# Patient Record
Sex: MALE | Race: WHITE | NOT HISPANIC OR LATINO
[De-identification: names, ages, dates, MRNs, and addresses within clinical notes are randomized per-mention and may not be internally consistent; named-entity substitution may affect disease eponyms.]

---

## 2022-04-14 ENCOUNTER — NON-APPOINTMENT (OUTPATIENT)
Age: 62
End: 2022-04-14

## 2022-06-20 PROBLEM — Z00.00 ENCOUNTER FOR PREVENTIVE HEALTH EXAMINATION: Status: ACTIVE | Noted: 2022-06-20

## 2022-06-22 ENCOUNTER — NON-APPOINTMENT (OUTPATIENT)
Age: 62
End: 2022-06-22

## 2022-06-22 ENCOUNTER — APPOINTMENT (OUTPATIENT)
Dept: GASTROENTEROLOGY | Facility: CLINIC | Age: 62
End: 2022-06-22
Payer: COMMERCIAL

## 2022-06-22 VITALS
OXYGEN SATURATION: 98 % | BODY MASS INDEX: 34.22 KG/M2 | RESPIRATION RATE: 16 BRPM | WEIGHT: 239 LBS | HEIGHT: 70 IN | DIASTOLIC BLOOD PRESSURE: 90 MMHG | TEMPERATURE: 97.6 F | HEART RATE: 61 BPM | SYSTOLIC BLOOD PRESSURE: 130 MMHG

## 2022-06-22 DIAGNOSIS — Z01.818 ENCOUNTER FOR OTHER PREPROCEDURAL EXAMINATION: ICD-10-CM

## 2022-06-22 DIAGNOSIS — Z78.9 OTHER SPECIFIED HEALTH STATUS: ICD-10-CM

## 2022-06-22 PROCEDURE — 99204 OFFICE O/P NEW MOD 45 MIN: CPT

## 2022-06-22 RX ORDER — POLYETHYLENE GLYCOL 3350 AND ELECTROLYTES WITH LEMON FLAVOR 236; 22.74; 6.74; 5.86; 2.97 G/4L; G/4L; G/4L; G/4L; G/4L
236 POWDER, FOR SOLUTION ORAL
Qty: 1 | Refills: 0 | Status: ACTIVE | COMMUNITY
Start: 2022-06-22 | End: 1900-01-01

## 2022-06-22 NOTE — ASSESSMENT
[FreeTextEntry1] : 62 M with pmh of kidney stones presents for evaluation of screening colonoscopy and constipation.\par \par Constipation: Improved\par -Counseled on diagram/time, hydration, high-fiber diet\par -Can consider fiber supplement such as psyllium or Metamucil\par -MiraLAX as needed for daily soft bowel movements\par -We will check labs including CBC, CMP, celiac serology, thyroid profile\par -Plan for colonoscopy as below due to change in bowel habits\par \par CRC screening: Reports last colonoscopy was 7-8 years ago, report not available, thinks it was normal\par -We will plan for colonoscopy pending labs\par Colonoscopy to be scheduled. I have discussed the indications, risks and benefits of procedure with patient. Risks include, but not limited to, bleeding, perforation, infection, and reaction to anesthesia.  Patient was given the opportunity to ask questions, all questions were answered. The patient agrees to proceed with colonoscopy. Will provide prep instructions. Labs prior to procedure. COVID swab prior to procedure. \par \par

## 2022-06-22 NOTE — PHYSICAL EXAM
[General Appearance - Alert] : alert [General Appearance - In No Acute Distress] : in no acute distress [Sclera] : the sclera and conjunctiva were normal [Outer Ear] : the ears and nose were normal in appearance [Neck Appearance] : the appearance of the neck was normal [] : no respiratory distress [Normal] : the heart rate was normal [Abdomen Soft] : soft [Skin Color & Pigmentation] : normal skin color and pigmentation [Oriented To Time, Place, And Person] : oriented to person, place, and time [Abnormal Walk] : normal gait

## 2022-06-22 NOTE — HISTORY OF PRESENT ILLNESS
[de-identified] : 62 M with pmh of kidney stones presents for evaluation of screening colonoscopy and constipation.\par \par Patient reports that he was having constipation at the beginning of this year, at that time was also diagnosed with kidney stones and was being treated at another hospital.  At that time was not able to have a bowel movement every day, daily stool softener such as Dulcolax.  Subsequently started drinking more water as he had drank any water before.  Reports that change his doctor staying hydrated his constipation improved significantly, up to 80%, has daily bms, sometimes stool is hard, no blood in stool\par States that he is also due for a colonoscopy, last colonoscopy was 7-8 years ago and was asked to follow-up for repeat after 5 years.\par Denies any nausea/vomiting/fever/chills/pain abdomen/reflux/dysphagia\par No recent change in weight or appetite\par No family history of GI malignancy\par \par Last EGD 2003\par Last colonoscopy 7-8 years ago, report not available

## 2022-07-07 ENCOUNTER — LABORATORY RESULT (OUTPATIENT)
Age: 62
End: 2022-07-07

## 2022-07-10 ENCOUNTER — TRANSCRIPTION ENCOUNTER (OUTPATIENT)
Age: 62
End: 2022-07-10

## 2022-07-11 ENCOUNTER — APPOINTMENT (OUTPATIENT)
Dept: GASTROENTEROLOGY | Facility: GI CENTER | Age: 62
End: 2022-07-11

## 2022-07-11 ENCOUNTER — OUTPATIENT (OUTPATIENT)
Dept: OUTPATIENT SERVICES | Facility: HOSPITAL | Age: 62
LOS: 1 days | End: 2022-07-11
Payer: COMMERCIAL

## 2022-07-11 DIAGNOSIS — Z12.11 ENCOUNTER FOR SCREENING FOR MALIGNANT NEOPLASM OF COLON: ICD-10-CM

## 2022-07-11 PROCEDURE — G0121: CPT

## 2022-07-11 PROCEDURE — 45378 DIAGNOSTIC COLONOSCOPY: CPT | Mod: 53,33

## 2022-07-11 NOTE — PHYSICAL EXAM
[General Appearance - Alert] : alert [General Appearance - In No Acute Distress] : in no acute distress [Sclera] : the sclera and conjunctiva were normal [Outer Ear] : the ears and nose were normal in appearance [Neck Appearance] : the appearance of the neck was normal [] : no respiratory distress [Abdomen Soft] : soft [Abdomen Tenderness] : non-tender [Abnormal Walk] : normal gait [Skin Color & Pigmentation] : normal skin color and pigmentation [Oriented To Time, Place, And Person] : oriented to person, place, and time

## 2022-07-11 NOTE — HISTORY OF PRESENT ILLNESS
[de-identified] : 62 M with pmh of kidney stones presents for evaluation of screening colonoscopy

## 2022-07-11 NOTE — ASSESSMENT
[FreeTextEntry1] : Patient here for colonoscopy.  Patient completed prep, n.p.o. past midnight.  Explained the procedure including risks/benefits and adverse events such as infection, bleeding, perforation and possible risks with anesthesia.  Patient agreeable, will proceed with procedure.  We will obtain consent.\par \par \par pt notified about abnormal thyroids function tests, he will f/u with his pcp\par \par addendum: pt had diverticulosis and  tortuous , fixed colon, incomplete colonoscopy, only able to advance till transverse colon, will order CT colonography

## 2022-07-12 ENCOUNTER — OUTPATIENT (OUTPATIENT)
Dept: OUTPATIENT SERVICES | Facility: HOSPITAL | Age: 62
LOS: 1 days | End: 2022-07-12
Payer: COMMERCIAL

## 2022-07-12 ENCOUNTER — APPOINTMENT (OUTPATIENT)
Dept: CT IMAGING | Facility: CLINIC | Age: 62
End: 2022-07-12

## 2022-07-12 DIAGNOSIS — Z12.11 ENCOUNTER FOR SCREENING FOR MALIGNANT NEOPLASM OF COLON: ICD-10-CM

## 2022-07-12 LAB
ALBUMIN SERPL ELPH-MCNC: 4 G/DL
ALP BLD-CCNC: 91 U/L
ALT SERPL-CCNC: 18 U/L
ANION GAP SERPL CALC-SCNC: 9 MMOL/L
AST SERPL-CCNC: 15 U/L
BASOPHILS # BLD AUTO: 0.05 K/UL
BASOPHILS NFR BLD AUTO: 0.9 %
BILIRUB SERPL-MCNC: 0.3 MG/DL
BUN SERPL-MCNC: 10 MG/DL
CALCIUM SERPL-MCNC: 9.1 MG/DL
CHLORIDE SERPL-SCNC: 109 MMOL/L
CO2 SERPL-SCNC: 26 MMOL/L
CREAT SERPL-MCNC: 1.07 MG/DL
EGFR: 79 ML/MIN/1.73M2
EOSINOPHIL # BLD AUTO: 0.38 K/UL
EOSINOPHIL NFR BLD AUTO: 6.5 %
GLIADIN IGA SER QL: <5 UNITS
GLIADIN IGG SER QL: <5 UNITS
GLIADIN PEPTIDE IGA SER-ACNC: NEGATIVE
GLIADIN PEPTIDE IGG SER-ACNC: NEGATIVE
GLUCOSE SERPL-MCNC: 101 MG/DL
HCT VFR BLD CALC: 42.4 %
HGB BLD-MCNC: 13.9 G/DL
IGA SER QL IEP: 317 MG/DL
IMM GRANULOCYTES NFR BLD AUTO: 0.2 %
LYMPHOCYTES # BLD AUTO: 2.25 K/UL
LYMPHOCYTES NFR BLD AUTO: 38.6 %
MAN DIFF?: NORMAL
MCHC RBC-ENTMCNC: 29.3 PG
MCHC RBC-ENTMCNC: 32.8 GM/DL
MCV RBC AUTO: 89.3 FL
MONOCYTES # BLD AUTO: 0.47 K/UL
MONOCYTES NFR BLD AUTO: 8.1 %
NEUTROPHILS # BLD AUTO: 2.67 K/UL
NEUTROPHILS NFR BLD AUTO: 45.7 %
PLATELET # BLD AUTO: 161 K/UL
POTASSIUM SERPL-SCNC: 4.6 MMOL/L
PROT SERPL-MCNC: 6.6 G/DL
RBC # BLD: 4.75 M/UL
RBC # FLD: 14.1 %
SARS-COV-2 N GENE NPH QL NAA+PROBE: NOT DETECTED
SODIUM SERPL-SCNC: 143 MMOL/L
TSH SERPL-ACNC: 6.52 UIU/ML
TTG IGA SER IA-ACNC: <1.2 U/ML
TTG IGA SER-ACNC: NEGATIVE
TTG IGG SER IA-ACNC: <1.2 U/ML
TTG IGG SER IA-ACNC: NEGATIVE
WBC # FLD AUTO: 5.83 K/UL

## 2022-07-12 PROCEDURE — 74261 CT COLONOGRAPHY DX: CPT

## 2022-07-12 PROCEDURE — 74261 CT COLONOGRAPHY DX: CPT | Mod: 26

## 2022-09-09 ENCOUNTER — APPOINTMENT (OUTPATIENT)
Dept: GASTROENTEROLOGY | Facility: CLINIC | Age: 62
End: 2022-09-09

## 2022-09-09 VITALS
OXYGEN SATURATION: 98 % | HEART RATE: 70 BPM | WEIGHT: 239 LBS | RESPIRATION RATE: 16 BRPM | BODY MASS INDEX: 34.22 KG/M2 | DIASTOLIC BLOOD PRESSURE: 80 MMHG | SYSTOLIC BLOOD PRESSURE: 125 MMHG | HEIGHT: 70 IN | TEMPERATURE: 98 F

## 2022-09-09 DIAGNOSIS — K57.30 DIVERTICULOSIS OF LARGE INTESTINE W/OUT PERFORATION OR ABSCESS W/OUT BLEEDING: ICD-10-CM

## 2022-09-09 DIAGNOSIS — K59.00 CONSTIPATION, UNSPECIFIED: ICD-10-CM

## 2022-09-09 DIAGNOSIS — Z09 ENCOUNTER FOR FOLLOW-UP EXAMINATION AFTER COMPLETED TREATMENT FOR CONDITIONS OTHER THAN MALIGNANT NEOPLASM: ICD-10-CM

## 2022-09-09 PROCEDURE — 99212 OFFICE O/P EST SF 10 MIN: CPT

## 2022-09-09 NOTE — PHYSICAL EXAM
[General Appearance - Alert] : alert [General Appearance - In No Acute Distress] : in no acute distress [Sclera] : the sclera and conjunctiva were normal [PERRL With Normal Accommodation] : pupils were equal in size, round, and reactive to light [Extraocular Movements] : extraocular movements were intact [Outer Ear] : the ears and nose were normal in appearance [Oropharynx] : the oropharynx was normal [Neck Appearance] : the appearance of the neck was normal [Neck Cervical Mass (___cm)] : no neck mass was observed [Jugular Venous Distention Increased] : there was no jugular-venous distention [Thyroid Diffuse Enlargement] : the thyroid was not enlarged [Thyroid Nodule] : there were no palpable thyroid nodules [Auscultation Breath Sounds / Voice Sounds] : lungs were clear to auscultation bilaterally [Heart Rate And Rhythm] : heart rate was normal and rhythm regular [Heart Sounds] : normal S1 and S2 [Heart Sounds Gallop] : no gallops [Murmurs] : no murmurs [Heart Sounds Pericardial Friction Rub] : no pericardial rub [Bowel Sounds] : normal bowel sounds [Abdomen Soft] : soft [Abdomen Tenderness] : non-tender [Abdomen Mass (___ Cm)] : no abdominal mass palpated [Skin Color & Pigmentation] : normal skin color and pigmentation [Skin Turgor] : normal skin turgor [] : no rash [No Focal Deficits] : no focal deficits [Oriented To Time, Place, And Person] : oriented to person, place, and time [Impaired Insight] : insight and judgment were intact [Affect] : the affect was normal

## 2022-09-09 NOTE — HISTORY OF PRESENT ILLNESS
[de-identified] : The patient arrived after a CT colonography test.  The patient has been evaluated by Dr. Jackman.  The patient has no complaints.  He had undergone a colonoscopy which revealed diverticulosis but it was incomplete.  CT colonography showed no evidence of any polyps.  He has no complaint except for occasional constipation.

## 2022-09-09 NOTE — ASSESSMENT
[FreeTextEntry1] : Discussed the colonoscopy finding and CT colonography findings at length.  As patient has no polyps, will consider colonoscopy versus CT colonography in 5 years.  Due to diverticulosis and constipation, high-fiber diet was recommended.  Follow-up as needed.\par \par Leandro Hanks MD\par Gastroenterology \par \par

## 2023-08-21 ENCOUNTER — OFFICE (OUTPATIENT)
Dept: URBAN - METROPOLITAN AREA CLINIC 12 | Facility: CLINIC | Age: 63
Setting detail: OPHTHALMOLOGY
End: 2023-08-21
Payer: COMMERCIAL

## 2023-08-21 DIAGNOSIS — H43.393: ICD-10-CM

## 2023-08-21 DIAGNOSIS — H25.13: ICD-10-CM

## 2023-08-21 DIAGNOSIS — H52.4: ICD-10-CM

## 2023-08-21 DIAGNOSIS — H53.002: ICD-10-CM

## 2023-08-21 PROCEDURE — 92014 COMPRE OPH EXAM EST PT 1/>: CPT | Performed by: OPHTHALMOLOGY

## 2023-08-21 PROCEDURE — 92015 DETERMINE REFRACTIVE STATE: CPT | Performed by: OPHTHALMOLOGY

## 2023-08-21 ASSESSMENT — AXIALLENGTH_DERIVED
OS_AL: 21.8951
OD_AL: 22.4078
OD_AL: 22.5404
OS_AL: 21.5646
OS_AL: 21.8532
OD_AL: 22.2334

## 2023-08-21 ASSESSMENT — KERATOMETRY
OD_K2POWER_DIOPTERS: 44.75
OS_AXISANGLE_DEGREES: 010
OS_K1POWER_DIOPTERS: 43.75
OS_K2POWER_DIOPTERS: 45.25
OD_K1POWER_DIOPTERS: 44.00
OD_AXISANGLE_DEGREES: 006

## 2023-08-21 ASSESSMENT — VISUAL ACUITY
OD_BCVA: 20/100-
OS_BCVA: 20/50+

## 2023-08-21 ASSESSMENT — SPHEQUIV_DERIVED
OD_SPHEQUIV: 2.875
OD_SPHEQUIV: 2.375
OS_SPHEQUIV: 3.875
OS_SPHEQUIV: 3.75
OD_SPHEQUIV: 2
OS_SPHEQUIV: 4.75

## 2023-08-21 ASSESSMENT — REFRACTION_CURRENTRX
OS_AXIS: 075
OD_ADD: +1.25
OS_OVR_VA: 20/
OS_SPHERE: +3.50
OD_CYLINDER: -1.00
OD_AXIS: 086
OD_OVR_VA: 20/
OS_CYLINDER: -1.00
OS_ADD: +1.25
OD_SPHERE: +2.25

## 2023-08-21 ASSESSMENT — REFRACTION_MANIFEST
OD_VA1: 20/20-
OS_ADD: +2.00
OD_SPHERE: +2.25
OD_VA1: 20/20
OS_ADD: +2.25
OS_SPHERE: +4.00
OD_ADD: +2.25
OS_CYLINDER: -0.75
OD_CYLINDER: -0.75
OD_AXIS: 090
OS_AXIS: 100
OD_SPHERE: +2.75
OD_AXIS: 105
OS_AXIS: 110
OS_SPHERE: +4.25
OD_CYLINDER: -0.50
OS_VA1: 20/30-
OS_CYLINDER: -0.50
OS_VA1: 20/50-2
OD_ADD: +2.00

## 2023-08-21 ASSESSMENT — REFRACTION_AUTOREFRACTION
OD_SPHERE: +3.25
OS_CYLINDER: -1.00
OD_CYLINDER: -0.75
OS_SPHERE: +5.25
OD_AXIS: 096
OS_AXIS: 109

## 2023-08-21 ASSESSMENT — TONOMETRY
OS_IOP_MMHG: 15
OD_IOP_MMHG: 11

## 2023-08-21 ASSESSMENT — CONFRONTATIONAL VISUAL FIELD TEST (CVF)
OS_FINDINGS: FULL
OD_FINDINGS: FULL

## 2023-09-01 ENCOUNTER — APPOINTMENT (OUTPATIENT)
Dept: ORTHOPEDIC SURGERY | Facility: CLINIC | Age: 63
End: 2023-09-01
Payer: COMMERCIAL

## 2023-09-01 VITALS — BODY MASS INDEX: 35.55 KG/M2 | WEIGHT: 240 LBS | HEIGHT: 69 IN

## 2023-09-01 DIAGNOSIS — M47.816 SPONDYLOSIS W/OUT MYELOPATHY OR RADICULOPATHY, LUMBAR REGION: ICD-10-CM

## 2023-09-01 DIAGNOSIS — M54.9 DORSALGIA, UNSPECIFIED: ICD-10-CM

## 2023-09-01 DIAGNOSIS — M47.814 SPONDYLOSIS W/OUT MYELOPATHY OR RADICULOPATHY, THORACIC REGION: ICD-10-CM

## 2023-09-01 PROCEDURE — 72070 X-RAY EXAM THORAC SPINE 2VWS: CPT

## 2023-09-01 PROCEDURE — 99203 OFFICE O/P NEW LOW 30 MIN: CPT

## 2023-09-01 PROCEDURE — 72100 X-RAY EXAM L-S SPINE 2/3 VWS: CPT

## 2023-09-01 NOTE — ASSESSMENT
[FreeTextEntry1] : 64 y/o male with thoracic and lumbar degeneration, with acute back pain episode.. Patient was provided with a referral for lumbar and thoracic physical therapy to work on stretching, strengthening and range of motion. Patient was provided with a lumbar home exercise program. I am prescribing the patient Meloxicam (15mg x7-10 days then 7.5mg up to BID PRN) for pain relief. Titration Schedule Provided. Follow up as needed.  Prior to appointment and during encounter with patient extensive medical records were reviewed including but not limited to, hospital records, out patient records, imaging results, and lab data. During this appointment the patient was examined, diagnoses were discussed and explained in a face to face manner. In addition extensive time was spent reviewing aforementioned diagnostic studies. Counseling including abnormal image results, differential diagnoses, treatment options, risk and benefits, lifestyle changes, current condition, and current medications was performed. Patient's comments, questions, and concerns were address and patient verbalized understanding. Based on this patient's presentation at our office, which is an orthopedic spine surgeon's office, this patient inherently / intrinsically has a risk, however minute, of developing issues such as Cauda equina syndrome, bowel and bladder changes, or progression of motor or neurological deficits such as paralysis which may be permanent.   I, Marisa Patel, attest that this documentation has been prepared under the direction and in the presence of provider Shay Morales MD.

## 2023-09-01 NOTE — PHYSICAL EXAM
[de-identified] : Constitutional: - General Appearance: Unremarkable Body Habitus Well Developed Well Nourished Body Habitus No Deformities Well Groomed Ability To communicate: Normal Neurologic: Global sensation is intact to upper and lower extremities. See examination of Neck and/or Spine for exceptions. Orientation to Time, Place and Person is: Normal Mood And Affect is Normal Skin: - Head/Face, Right Upper/Lower Extremity, Left Upper/Lower Extremity: Normal See Examination of Neck and/or Spine for exceptions Cardiovascular: Peripheral Cardiovascular System is Normal Palpation of Lymph Nodes: Normal Palpation of lymph nodes in: Axilla, Cervical, Inguinal Abnormal Palpation of lymph nodes in: None  [] : non-antalgic [FreeTextEntry8] : tenderness from t10 to pelvis b/l  [FreeTextEntry1] : On my interpretations of these images from [facility] in house on 09/01/2023: mod multi level DDD mild scoliosis with apex at T5 [[id number]] : forward flexion 60 degrees

## 2023-09-01 NOTE — HISTORY OF PRESENT ILLNESS
[Gradual] : gradual [8] : 8 [2] : 2 [Diffuse] : diffuse [Localized] : localized [Sharp] : sharp [Frequent] : frequent [Rest] : rest [Full time] : Work status: full time [de-identified] : 09/01/2023: Patient presenting today for an initial evaluation. He states mid back pain that started ~7 weeks prior.  No cause to injury. States pain mid back pain radiates to armpit line and down to low back. No pain wrapping around chest. No pain, numbness, tingling or weakness in the lower extremities b/l. No pain, numbness, tingling or weakness in the lower extremities b/l. Pain level is a 2-8/10 depending on day and activity. Has treated with Advil and states relief. Has treated with PT in past for low back pain ~40 years prior. Patient has knee problems. General health is good.     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

## 2023-12-19 ENCOUNTER — RX RENEWAL (OUTPATIENT)
Age: 63
End: 2023-12-19

## 2023-12-19 RX ORDER — MELOXICAM 15 MG/1
15 TABLET ORAL DAILY
Qty: 30 | Refills: 2 | Status: ACTIVE | COMMUNITY
Start: 2023-09-01 | End: 1900-01-01

## 2024-02-26 ENCOUNTER — OFFICE (OUTPATIENT)
Dept: URBAN - METROPOLITAN AREA CLINIC 12 | Facility: CLINIC | Age: 64
Setting detail: OPHTHALMOLOGY
End: 2024-02-26
Payer: COMMERCIAL

## 2024-02-26 DIAGNOSIS — H43.393: ICD-10-CM

## 2024-02-26 DIAGNOSIS — H52.4: ICD-10-CM

## 2024-02-26 DIAGNOSIS — H90.3: ICD-10-CM

## 2024-02-26 DIAGNOSIS — H35.373: ICD-10-CM

## 2024-02-26 DIAGNOSIS — H53.002: ICD-10-CM

## 2024-02-26 DIAGNOSIS — H25.13: ICD-10-CM

## 2024-02-26 PROCEDURE — 92014 COMPRE OPH EXAM EST PT 1/>: CPT | Performed by: OPHTHALMOLOGY

## 2024-02-26 PROCEDURE — 92015 DETERMINE REFRACTIVE STATE: CPT | Performed by: OPHTHALMOLOGY

## 2024-02-26 PROCEDURE — 92134 CPTRZ OPH DX IMG PST SGM RTA: CPT | Performed by: OPHTHALMOLOGY

## 2024-02-26 PROCEDURE — 92557 COMPREHENSIVE HEARING TEST: CPT

## 2024-02-26 PROCEDURE — 92567 TYMPANOMETRY: CPT

## 2024-02-26 ASSESSMENT — REFRACTION_MANIFEST
OD_CYLINDER: -0.75
OD_VA1: 20/20
OS_ADD: +2.00
OS_SPHERE: +4.25
OS_AXIS: 100
OS_CYLINDER: -0.75
OD_CYLINDER: -0.50
OS_AXIS: 108
OS_SPHERE: +5.00
OD_ADD: +2.00
OS_CYLINDER: -0.75
OD_AXIS: 089
OS_VA1: 20/50-2
OD_AXIS: 090
OS_ADD: +2.25
OS_VA1: 20/30-2
OD_SPHERE: +3.25
OD_SPHERE: +2.75
OD_VA1: 20/20-
OD_ADD: +2.25

## 2024-02-26 ASSESSMENT — REFRACTION_AUTOREFRACTION
OS_SPHERE: +5.00
OS_CYLINDER: -0.75
OD_AXIS: 089
OS_AXIS: 108
OD_SPHERE: +3.25
OD_CYLINDER: -0.50

## 2024-02-26 ASSESSMENT — CONFRONTATIONAL VISUAL FIELD TEST (CVF)
OD_FINDINGS: FULL
OS_FINDINGS: FULL

## 2024-02-26 ASSESSMENT — REFRACTION_CURRENTRX
OS_VPRISM_DIRECTION: PROGS
OS_AXIS: 087
OD_OVR_VA: 20/
OS_CYLINDER: -1.00
OD_SPHERE: +2.25
OD_ADD: +1.75
OD_AXIS: 089
OS_OVR_VA: 20/
OD_VPRISM_DIRECTION: PROGS
OD_CYLINDER: -0.75
OS_SPHERE: +3.25
OS_ADD: +1.75

## 2024-02-26 ASSESSMENT — SPHEQUIV_DERIVED
OS_SPHEQUIV: 4.625
OS_SPHEQUIV: 4.625
OD_SPHEQUIV: 3
OS_SPHEQUIV: 3.875
OD_SPHEQUIV: 2.375
OD_SPHEQUIV: 3

## 2024-08-29 ENCOUNTER — OFFICE (OUTPATIENT)
Dept: URBAN - METROPOLITAN AREA CLINIC 12 | Facility: CLINIC | Age: 64
Setting detail: OPHTHALMOLOGY
End: 2024-08-29
Payer: COMMERCIAL

## 2024-08-29 DIAGNOSIS — H53.002: ICD-10-CM

## 2024-08-29 DIAGNOSIS — H43.393: ICD-10-CM

## 2024-08-29 DIAGNOSIS — H25.13: ICD-10-CM

## 2024-08-29 DIAGNOSIS — H35.373: ICD-10-CM

## 2024-08-29 PROCEDURE — 92014 COMPRE OPH EXAM EST PT 1/>: CPT | Performed by: OPHTHALMOLOGY

## 2024-08-29 PROCEDURE — 92250 FUNDUS PHOTOGRAPHY W/I&R: CPT | Performed by: OPHTHALMOLOGY

## 2024-08-29 ASSESSMENT — CONFRONTATIONAL VISUAL FIELD TEST (CVF)
OS_FINDINGS: FULL
OD_FINDINGS: FULL

## 2024-10-29 ENCOUNTER — OFFICE (OUTPATIENT)
Dept: URBAN - METROPOLITAN AREA CLINIC 12 | Facility: CLINIC | Age: 64
Setting detail: OPHTHALMOLOGY
End: 2024-10-29
Payer: COMMERCIAL

## 2024-10-29 DIAGNOSIS — H00.14: ICD-10-CM
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
## 2024-10-29 PROCEDURE — 99213 OFFICE O/P EST LOW 20 MIN: CPT | Performed by: OPTOMETRIST

## 2024-10-29 PROCEDURE — BRUDER EYE BRUDER EYE PADS: Performed by: OPTOMETRIST

## 2024-10-29 ASSESSMENT — KERATOMETRY
OD_AXISANGLE_DEGREES: 012
OD_K1POWER_DIOPTERS: 44.25
OS_K2POWER_DIOPTERS: 45.00
OS_K1POWER_DIOPTERS: 43.50
OS_AXISANGLE_DEGREES: 017
OD_K2POWER_DIOPTERS: 45.00

## 2024-10-29 ASSESSMENT — REFRACTION_MANIFEST
OS_CYLINDER: -0.75
OS_AXIS: 100
OD_AXIS: 089
OS_AXIS: 110
OD_AXIS: 090
OD_VA1: 20/25
OD_CYLINDER: -0.75
OD_SPHERE: +2.75
OD_CYLINDER: -0.50
OD_ADD: +2.25
OS_AXIS: 108
OS_SPHERE: +5.50
OS_CYLINDER: -0.75
OS_SPHERE: +5.00
OD_ADD: +2.00
OD_VA1: 20/20
OS_VA1: 20/NI
OD_VA1: 20/20-
OS_CYLINDER: -1.25
OD_SPHERE: +3.25
OD_CYLINDER: -0.50
OS_ADD: +2.00
OS_ADD: +2.25
OD_SPHERE: +3.25
OD_AXIS: 100
OS_VA1: 20/50-2
OS_VA1: 20/30-2
OS_SPHERE: +4.25

## 2024-10-29 ASSESSMENT — REFRACTION_AUTOREFRACTION
OS_SPHERE: +5.75
OD_SPHERE: +3.50
OD_CYLINDER: -0.75
OD_AXIS: 099
OS_CYLINDER: -1.25
OS_AXIS: 107

## 2024-10-29 ASSESSMENT — REFRACTION_CURRENTRX
OS_AXIS: 104
OD_VPRISM_DIRECTION: PROGS
OD_ADD: +2.00
OS_OVR_VA: 20/
OS_ADD: +2.00
OS_VPRISM_DIRECTION: PROGS
OD_SPHERE: +2.25
OD_OVR_VA: 20/
OS_CYLINDER: -0.50
OD_CYLINDER: -0.75
OS_SPHERE: +4.00
OD_AXIS: 088

## 2024-10-29 ASSESSMENT — CONFRONTATIONAL VISUAL FIELD TEST (CVF)
OS_FINDINGS: FULL
OD_FINDINGS: FULL

## 2024-10-29 ASSESSMENT — TONOMETRY
OS_IOP_MMHG: 11
OD_IOP_MMHG: 11

## 2024-10-29 ASSESSMENT — VISUAL ACUITY
OD_BCVA: 20/60+1
OS_BCVA: 20/25-2

## 2024-11-17 ENCOUNTER — OFFICE (OUTPATIENT)
Dept: URBAN - METROPOLITAN AREA CLINIC 100 | Facility: CLINIC | Age: 64
Setting detail: OPHTHALMOLOGY
End: 2024-11-17
Payer: COMMERCIAL

## 2024-11-17 DIAGNOSIS — H00.14: ICD-10-CM

## 2024-11-17 PROCEDURE — 99213 OFFICE O/P EST LOW 20 MIN: CPT | Performed by: STUDENT IN AN ORGANIZED HEALTH CARE EDUCATION/TRAINING PROGRAM

## 2024-11-17 ASSESSMENT — REFRACTION_AUTOREFRACTION
OD_CYLINDER: -0.75
OS_SPHERE: +5.75
OS_AXIS: 107
OD_AXIS: 099
OD_SPHERE: +3.50
OS_CYLINDER: -1.25

## 2024-11-17 ASSESSMENT — KERATOMETRY
OS_K1POWER_DIOPTERS: 43.50
OD_AXISANGLE_DEGREES: 012
OS_AXISANGLE_DEGREES: 017
OD_K2POWER_DIOPTERS: 45.00
OS_K2POWER_DIOPTERS: 45.00
OD_K1POWER_DIOPTERS: 44.25

## 2024-11-17 ASSESSMENT — VISUAL ACUITY
OD_BCVA: 20/70+1
OS_BCVA: 20/20

## 2024-11-17 ASSESSMENT — CONFRONTATIONAL VISUAL FIELD TEST (CVF)
OS_FINDINGS: FULL
OD_FINDINGS: FULL

## 2025-03-06 ENCOUNTER — OFFICE (OUTPATIENT)
Dept: URBAN - METROPOLITAN AREA CLINIC 12 | Facility: CLINIC | Age: 65
Setting detail: OPHTHALMOLOGY
End: 2025-03-06
Payer: COMMERCIAL

## 2025-03-06 DIAGNOSIS — H35.373: ICD-10-CM

## 2025-03-06 DIAGNOSIS — H93.13: ICD-10-CM

## 2025-03-06 DIAGNOSIS — H43.393: ICD-10-CM

## 2025-03-06 DIAGNOSIS — H25.13: ICD-10-CM

## 2025-03-06 DIAGNOSIS — H53.002: ICD-10-CM

## 2025-03-06 PROCEDURE — 92014 COMPRE OPH EXAM EST PT 1/>: CPT | Performed by: OPHTHALMOLOGY

## 2025-03-06 PROCEDURE — 92567 TYMPANOMETRY: CPT

## 2025-03-06 PROCEDURE — 92134 CPTRZ OPH DX IMG PST SGM RTA: CPT | Performed by: OPHTHALMOLOGY

## 2025-03-06 PROCEDURE — 92557 COMPREHENSIVE HEARING TEST: CPT

## 2025-03-06 ASSESSMENT — REFRACTION_CURRENTRX
OS_VPRISM_DIRECTION: PROGS
OD_SPHERE: +2.50
OD_OVR_VA: 20/
OS_AXIS: 110
OS_OVR_VA: 20/
OS_SPHERE: +4.25
OD_CYLINDER: -0.50
OD_ADD: +2.25
OD_VPRISM_DIRECTION: PROGS
OS_ADD: +2.25
OS_CYLINDER: -0.75
OD_AXIS: 87

## 2025-03-06 ASSESSMENT — KERATOMETRY
OD_K1POWER_DIOPTERS: 44.00
OS_AXISANGLE_DEGREES: 17
OD_K2POWER_DIOPTERS: 44.75
OD_AXISANGLE_DEGREES: 179
OS_K2POWER_DIOPTERS: 45.25
OS_K1POWER_DIOPTERS: 44.00

## 2025-03-06 ASSESSMENT — VISUAL ACUITY
OS_BCVA: 20/20
OD_BCVA: 20/60

## 2025-03-06 ASSESSMENT — REFRACTION_MANIFEST
OS_AXIS: 108
OD_CYLINDER: -0.75
OS_SPHERE: +5.50
OD_AXIS: 87
OD_SPHERE: +3.25
OS_VA1: 20/40-2
OS_CYLINDER: -1.00

## 2025-03-06 ASSESSMENT — REFRACTION_AUTOREFRACTION
OS_CYLINDER: -1.00
OS_SPHERE: +5.50
OS_AXIS: 108
OD_SPHERE: +3.25
OD_AXIS: 87
OD_CYLINDER: -0.75

## 2025-03-06 ASSESSMENT — CONFRONTATIONAL VISUAL FIELD TEST (CVF)
OS_FINDINGS: FULL
OD_FINDINGS: FULL

## 2025-03-06 ASSESSMENT — TONOMETRY
OD_IOP_MMHG: 17
OS_IOP_MMHG: 18

## 2025-04-03 ENCOUNTER — APPOINTMENT (OUTPATIENT)
Dept: ORTHOPEDIC SURGERY | Facility: CLINIC | Age: 65
End: 2025-04-03
Payer: COMMERCIAL

## 2025-04-03 DIAGNOSIS — M53.3 SACROCOCCYGEAL DISORDERS, NOT ELSEWHERE CLASSIFIED: ICD-10-CM

## 2025-04-03 PROCEDURE — 72200 X-RAY EXAM SI JOINTS: CPT

## 2025-04-03 PROCEDURE — 99203 OFFICE O/P NEW LOW 30 MIN: CPT

## 2025-04-17 ENCOUNTER — APPOINTMENT (OUTPATIENT)
Dept: ORTHOPEDIC SURGERY | Facility: CLINIC | Age: 65
End: 2025-04-17
Payer: COMMERCIAL

## 2025-04-17 DIAGNOSIS — M53.3 SACROCOCCYGEAL DISORDERS, NOT ELSEWHERE CLASSIFIED: ICD-10-CM

## 2025-04-17 DIAGNOSIS — M47.816 SPONDYLOSIS W/OUT MYELOPATHY OR RADICULOPATHY, LUMBAR REGION: ICD-10-CM

## 2025-04-17 PROCEDURE — 99213 OFFICE O/P EST LOW 20 MIN: CPT

## (undated) DEVICE — CSC-COLONOSCOPE 2002772: Type: DURABLE MEDICAL EQUIPMENT

## (undated) DEVICE — VALVE ENDOSCOPE DEFENDO SINGLE USE